# Patient Record
Sex: FEMALE | Race: BLACK OR AFRICAN AMERICAN | NOT HISPANIC OR LATINO
[De-identification: names, ages, dates, MRNs, and addresses within clinical notes are randomized per-mention and may not be internally consistent; named-entity substitution may affect disease eponyms.]

---

## 2017-03-10 PROBLEM — Z00.00 ENCOUNTER FOR PREVENTIVE HEALTH EXAMINATION: Status: ACTIVE | Noted: 2017-03-10

## 2018-03-26 ENCOUNTER — APPOINTMENT (OUTPATIENT)
Dept: PULMONOLOGY | Facility: CLINIC | Age: 83
End: 2018-03-26
Payer: MEDICARE

## 2018-03-26 VITALS
HEART RATE: 69 BPM | WEIGHT: 154 LBS | RESPIRATION RATE: 16 BRPM | DIASTOLIC BLOOD PRESSURE: 68 MMHG | TEMPERATURE: 99.2 F | OXYGEN SATURATION: 99 % | SYSTOLIC BLOOD PRESSURE: 140 MMHG | BODY MASS INDEX: 26.29 KG/M2 | HEIGHT: 64 IN

## 2018-03-26 DIAGNOSIS — Z86.79 PERSONAL HISTORY OF OTHER DISEASES OF THE CIRCULATORY SYSTEM: ICD-10-CM

## 2018-03-26 DIAGNOSIS — R06.02 SHORTNESS OF BREATH: ICD-10-CM

## 2018-03-26 PROCEDURE — 94060 EVALUATION OF WHEEZING: CPT

## 2018-03-26 PROCEDURE — 99204 OFFICE O/P NEW MOD 45 MIN: CPT | Mod: 25

## 2018-03-26 PROCEDURE — 94729 DIFFUSING CAPACITY: CPT

## 2018-03-26 PROCEDURE — 94727 GAS DIL/WSHOT DETER LNG VOL: CPT

## 2018-03-26 RX ORDER — LISINOPRIL 20 MG/1
20 TABLET ORAL
Refills: 0 | Status: ACTIVE | COMMUNITY

## 2018-03-26 RX ORDER — AMLODIPINE BESYLATE 10 MG/1
10 TABLET ORAL
Refills: 0 | Status: ACTIVE | COMMUNITY

## 2018-10-29 ENCOUNTER — EMERGENCY (EMERGENCY)
Facility: HOSPITAL | Age: 83
LOS: 1 days | Discharge: HOME | End: 2018-10-29
Attending: EMERGENCY MEDICINE | Admitting: EMERGENCY MEDICINE
Payer: MEDICARE

## 2018-10-29 VITALS
SYSTOLIC BLOOD PRESSURE: 156 MMHG | RESPIRATION RATE: 18 BRPM | HEART RATE: 64 BPM | OXYGEN SATURATION: 100 % | DIASTOLIC BLOOD PRESSURE: 60 MMHG | TEMPERATURE: 99 F

## 2018-10-29 VITALS
HEART RATE: 66 BPM | SYSTOLIC BLOOD PRESSURE: 161 MMHG | DIASTOLIC BLOOD PRESSURE: 90 MMHG | OXYGEN SATURATION: 100 % | RESPIRATION RATE: 18 BRPM

## 2018-10-29 DIAGNOSIS — Z90.49 ACQUIRED ABSENCE OF OTHER SPECIFIED PARTS OF DIGESTIVE TRACT: Chronic | ICD-10-CM

## 2018-10-29 DIAGNOSIS — D25.2 SUBSEROSAL LEIOMYOMA OF UTERUS: Chronic | ICD-10-CM

## 2018-10-29 DIAGNOSIS — Z90.89 ACQUIRED ABSENCE OF OTHER ORGANS: Chronic | ICD-10-CM

## 2018-10-29 DIAGNOSIS — Z90.710 ACQUIRED ABSENCE OF BOTH CERVIX AND UTERUS: Chronic | ICD-10-CM

## 2018-10-29 LAB
ALBUMIN SERPL ELPH-MCNC: 4.7 G/DL — SIGNIFICANT CHANGE UP (ref 3.3–5)
ALP SERPL-CCNC: 93 U/L — SIGNIFICANT CHANGE UP (ref 40–120)
ALT FLD-CCNC: 11 U/L — SIGNIFICANT CHANGE UP (ref 4–33)
AST SERPL-CCNC: 16 U/L — SIGNIFICANT CHANGE UP (ref 4–32)
BASOPHILS # BLD AUTO: 0.05 K/UL — SIGNIFICANT CHANGE UP (ref 0–0.2)
BASOPHILS NFR BLD AUTO: 0.6 % — SIGNIFICANT CHANGE UP (ref 0–2)
BILIRUB SERPL-MCNC: 0.5 MG/DL — SIGNIFICANT CHANGE UP (ref 0.2–1.2)
BUN SERPL-MCNC: 13 MG/DL — SIGNIFICANT CHANGE UP (ref 7–23)
CALCIUM SERPL-MCNC: 9.9 MG/DL — SIGNIFICANT CHANGE UP (ref 8.4–10.5)
CHLORIDE SERPL-SCNC: 97 MMOL/L — LOW (ref 98–107)
CO2 SERPL-SCNC: 27 MMOL/L — SIGNIFICANT CHANGE UP (ref 22–31)
CREAT SERPL-MCNC: 0.91 MG/DL — SIGNIFICANT CHANGE UP (ref 0.5–1.3)
EOSINOPHIL # BLD AUTO: 0.04 K/UL — SIGNIFICANT CHANGE UP (ref 0–0.5)
EOSINOPHIL NFR BLD AUTO: 0.5 % — SIGNIFICANT CHANGE UP (ref 0–6)
GLUCOSE SERPL-MCNC: 95 MG/DL — SIGNIFICANT CHANGE UP (ref 70–99)
HCT VFR BLD CALC: 35 % — SIGNIFICANT CHANGE UP (ref 34.5–45)
HGB BLD-MCNC: 11.2 G/DL — LOW (ref 11.5–15.5)
IMM GRANULOCYTES # BLD AUTO: 0.02 # — SIGNIFICANT CHANGE UP
IMM GRANULOCYTES NFR BLD AUTO: 0.3 % — SIGNIFICANT CHANGE UP (ref 0–1.5)
LYMPHOCYTES # BLD AUTO: 2.2 K/UL — SIGNIFICANT CHANGE UP (ref 1–3.3)
LYMPHOCYTES # BLD AUTO: 27.6 % — SIGNIFICANT CHANGE UP (ref 13–44)
MCHC RBC-ENTMCNC: 25.1 PG — LOW (ref 27–34)
MCHC RBC-ENTMCNC: 32 % — SIGNIFICANT CHANGE UP (ref 32–36)
MCV RBC AUTO: 78.3 FL — LOW (ref 80–100)
MONOCYTES # BLD AUTO: 0.54 K/UL — SIGNIFICANT CHANGE UP (ref 0–0.9)
MONOCYTES NFR BLD AUTO: 6.8 % — SIGNIFICANT CHANGE UP (ref 2–14)
NEUTROPHILS # BLD AUTO: 5.11 K/UL — SIGNIFICANT CHANGE UP (ref 1.8–7.4)
NEUTROPHILS NFR BLD AUTO: 64.2 % — SIGNIFICANT CHANGE UP (ref 43–77)
NRBC # FLD: 0 — SIGNIFICANT CHANGE UP
PLATELET # BLD AUTO: 380 K/UL — SIGNIFICANT CHANGE UP (ref 150–400)
PMV BLD: 12.3 FL — SIGNIFICANT CHANGE UP (ref 7–13)
POTASSIUM SERPL-MCNC: 3.7 MMOL/L — SIGNIFICANT CHANGE UP (ref 3.5–5.3)
POTASSIUM SERPL-SCNC: 3.7 MMOL/L — SIGNIFICANT CHANGE UP (ref 3.5–5.3)
PROT SERPL-MCNC: 8.1 G/DL — SIGNIFICANT CHANGE UP (ref 6–8.3)
RBC # BLD: 4.47 M/UL — SIGNIFICANT CHANGE UP (ref 3.8–5.2)
RBC # FLD: 13.4 % — SIGNIFICANT CHANGE UP (ref 10.3–14.5)
SODIUM SERPL-SCNC: 138 MMOL/L — SIGNIFICANT CHANGE UP (ref 135–145)
TROPONIN T, HIGH SENSITIVITY: 7 NG/L — SIGNIFICANT CHANGE UP (ref ?–14)
TSH SERPL-MCNC: 3.2 UIU/ML — SIGNIFICANT CHANGE UP (ref 0.27–4.2)
WBC # BLD: 7.96 K/UL — SIGNIFICANT CHANGE UP (ref 3.8–10.5)
WBC # FLD AUTO: 7.96 K/UL — SIGNIFICANT CHANGE UP (ref 3.8–10.5)

## 2018-10-29 PROCEDURE — 71046 X-RAY EXAM CHEST 2 VIEWS: CPT | Mod: 26

## 2018-10-29 PROCEDURE — 99284 EMERGENCY DEPT VISIT MOD MDM: CPT | Mod: GC

## 2018-10-29 RX ORDER — SODIUM CHLORIDE 9 MG/ML
1000 INJECTION INTRAMUSCULAR; INTRAVENOUS; SUBCUTANEOUS ONCE
Qty: 0 | Refills: 0 | Status: COMPLETED | OUTPATIENT
Start: 2018-10-29 | End: 2018-10-29

## 2018-10-29 RX ADMIN — SODIUM CHLORIDE 2000 MILLILITER(S): 9 INJECTION INTRAMUSCULAR; INTRAVENOUS; SUBCUTANEOUS at 18:39

## 2018-10-29 NOTE — ED ADULT NURSE NOTE - OBJECTIVE STATEMENT
Pt received to spot #28. AAOx4, c/o feeling lightheadedness with occasional dizziness x1 month. Denies falls/LOC. Denies cp/sob. No neuro deficits noted. Able to move all extremities well. MD alexandre performed at bedside. #20g IVSL placed to left ac, labs drawn and sent. no acute distress. Awaiting further plan of care.

## 2018-10-29 NOTE — ED PROVIDER NOTE - PLAN OF CARE
You were seen in the ED for lightheadedness. You had lab work and imaging without clear cause. Please continue to follow with your PMD and neurology upon discharge.

## 2018-10-29 NOTE — ED PROVIDER NOTE - NS ED ROS FT
GENERAL: No fever or chills, EYES: no change in vision, HEENT: no trouble swallowing or speaking, CARDIAC: no chest pain, PULMONARY: no cough or SOB, GI: no abdominal pain, no nausea, no vomiting, no diarrhea or constipation, : No changes in urination, SKIN: no rashes, NEURO: no headache, +lightheadedness, MSK: No joint pain ~Shilo Nunez M.D. Resident

## 2018-10-29 NOTE — ED ADULT NURSE NOTE - NSIMPLEMENTINTERV_GEN_ALL_ED
Implemented All Universal Safety Interventions:  Ryan to call system. Call bell, personal items and telephone within reach. Instruct patient to call for assistance. Room bathroom lighting operational. Non-slip footwear when patient is off stretcher. Physically safe environment: no spills, clutter or unnecessary equipment. Stretcher in lowest position, wheels locked, appropriate side rails in place.

## 2018-10-29 NOTE — ED ADULT NURSE REASSESSMENT NOTE - NS ED NURSE REASSESS COMMENT FT1
PT in no acute distress, states improvement, would like to be dc'd.  PEr ED MD Hahn results WNL and stable for dc.  ED MD Cedeno @ bedside pt is dc'd copy results and follow up instructions reviewed w/ pt and provided.  IV removed by ED.  Stable at time of exit.

## 2018-10-29 NOTE — ED PROVIDER NOTE - CARE PLAN
Principal Discharge DX:	Giddiness  Assessment and plan of treatment:	You were seen in the ED for lightheadedness. You had lab work and imaging without clear cause. Please continue to follow with your PMD and neurology upon discharge. Principal Discharge DX:	Lightheadedness  Assessment and plan of treatment:	You were seen in the ED for lightheadedness. You had lab work and imaging without clear cause. Please continue to follow with your PMD and neurology upon discharge.

## 2018-10-29 NOTE — ED PROVIDER NOTE - OBJECTIVE STATEMENT
85 yo F PMHx HTN p/w lightheadedness x 1 month. Pt saw her PMD last week (Dr. Speedy Vences) who ana labs and referred her to neurology. She has an appt in 2 days but today she felt worsening fogginess and felt like she was going to pass out so she came to the ER. She has not syncopized for the past month and her lightheadedness has been constant - unchaged by position changed. Denies CP/palpitations, headaches, vision changes, N/V, abd pain, fevers/chills.

## 2018-10-29 NOTE — ED PROVIDER NOTE - PSH
H/O abdominal hysterectomy    History of appendectomy    History of tonsillectomy    Subserous leiomyoma of uterus

## 2018-10-29 NOTE — ED ADULT TRIAGE NOTE - CHIEF COMPLAINT QUOTE
Light headiness x 1 month, saw PMD and has neurologist appt scheduled for next week.  Boil on right side of gum.

## 2018-10-29 NOTE — ED PROVIDER NOTE - PHYSICAL EXAMINATION
Gen: AAOx3, non-toxic  Head: NCAT  HEENT: EOMI, oral mucosa moist, normal conjunctiva  Lung: CTAB, no respiratory distress, no wheezes/rhonchi/rales B/L, speaking in full sentences  CV: RRR, no murmurs, rubs or gallops  Abd: soft, NTND, no guarding  MSK: no visible deformities  Neuro: No focal sensory or motor deficits, PERRL, EOMi, CN II-XII intact, +5/5 upper and lower ext strength  Skin: Warm, well perfused, no rash  Psych: normal affect.   ~Shilo Nunez M.D. Resident

## 2018-10-29 NOTE — ED PROVIDER NOTE - ATTENDING CONTRIBUTION TO CARE
84F p/w lightheaded x 1 month, feels like she’s going to pass out.  Constant throughout the day, saw PCP, referred to neuro, has appt in 2 days.  Today sx are worse, she feels foggy.  VS wnl.  Exam benign, able to stand, sx do not worsen upon standing.  No room spinning.  No recent cardiac eval.  No CP/palps, normal neuro exam.  Plan check labs, EKG, give fluids.  EKG unchanged - cath 2 years ago was unremarkable.  Exam bengin.  Unclear etiology of sx but does not appear ill.  Check labs, give fluids, reassess, likely d/c home f/u PMD.  on 3 BP meds, but BP sl elevation here.   VS:  unremarkable    GEN - NAD; well appearing; A+O x3   HEAD - NC/AT     ENT - PEERL, EOMI, mucous membranes  moist , no discharge      NECK: Neck supple, non-tender without lymphadenopathy, no masses, no JVD  PULM - CTA b/l,  symmetric breath sounds  COR -  normal heart sounds    ABD - , ND, NT, soft,  BACK - no CVA tenderness, nontender spine     EXTREMS - no edema, no deformity, warm and well perfused    SKIN - no rash or bruising      NEUROLOGIC - alert, CN 2-12 intact, sensation nl, motor no focal deficit.

## 2018-10-29 NOTE — ED PROVIDER NOTE - MEDICAL DECISION MAKING DETAILS
85 yo F PMHx HTN p/w lightheadedness x 1 month, will obtain basic labs + trop, TSH, EKG, CXR, IVF, reevaluate 83 yo F PMHx HTN p/w lightheadedness x 1 month, will obtain basic labs + trop, TSH, EKG, CXR, IVF, reevaluate. EKG wnl, CXR clear. Labs wnl. D.c with neuro follow up

## 2023-02-06 ENCOUNTER — OFFICE (OUTPATIENT)
Dept: URBAN - METROPOLITAN AREA CLINIC 90 | Facility: CLINIC | Age: 88
Setting detail: OPHTHALMOLOGY
End: 2023-02-06
Payer: MEDICARE

## 2023-02-06 DIAGNOSIS — H40.1112: ICD-10-CM

## 2023-02-06 DIAGNOSIS — H40.1123: ICD-10-CM

## 2023-02-06 PROCEDURE — 92083 EXTENDED VISUAL FIELD XM: CPT | Performed by: OPHTHALMOLOGY

## 2023-02-07 ASSESSMENT — REFRACTION_CURRENTRX
OS_OVR_VA: 20/
OD_SPHERE: +2.25
OD_CYLINDER: -1.00
OS_ADD: +2.75
OD_AXIS: 098
OS_CYLINDER: -1.00
OS_CYLINDER: -0.75
OS_ADD: +2.75
OD_CYLINDER: -1.25
OS_OVR_VA: 20/
OD_AXIS: 095
OS_VPRISM_DIRECTION: PROGS
OS_AXIS: 086
OS_AXIS: 100
OD_VPRISM_DIRECTION: PROGS
OS_SPHERE: +1.50
OD_VPRISM_DIRECTION: PROGS
OS_VPRISM_DIRECTION: PROGS
OD_AXIS: 005
OS_OVR_VA: 20/
OS_SPHERE: +2.00
OD_ADD: +2.75
OS_VPRISM_DIRECTION: PROGS
OD_VPRISM_DIRECTION: PROGS
OS_CYLINDER: -0.75
OS_ADD: +2.75
OD_CYLINDER: +1.00
OS_CYLINDER: +1.00
OD_ADD: +2.75
OS_SPHERE: +1.00
OD_CYLINDER: -1.25
OD_OVR_VA: 20/
OD_SPHERE: +2.00
OD_VPRISM_DIRECTION: PROGS
OD_SPHERE: +1.25
OD_SPHERE: +2.00
OS_AXIS: 012
OD_AXIS: 090
OS_AXIS: 103
OS_ADD: +2.50
OD_ADD: +2.50
OS_SPHERE: +1.75
OD_ADD: +2.75
OS_VPRISM_DIRECTION: PROGS
OD_OVR_VA: 20/
OD_OVR_VA: 20/

## 2023-02-07 ASSESSMENT — REFRACTION_MANIFEST
OS_SPHERE: +1.00
OS_AXIS: 105
OD_SPHERE: +2.75
OS_CYLINDER: -1.25
OD_CYLINDER: -1.75
OS_SPHERE: UNABLE
OD_AXIS: 085
OD_VA2: 20/30(J2)
OS_SPHERE: +2.25
OD_CYLINDER: -2.00
OS_ADD: +3.00
OD_AXIS: 100
OS_CYLINDER: +0.75
OD_SPHERE: +0.75
OD_VA1: 20/40
OD_CYLINDER: +1.75
OD_ADD: +3.00
OS_VA2: 20/30(J2)
OS_AXIS: 020
OD_VA1: 20/40
OD_VA1: 20/40-2
OS_VA1: 20/40
OS_VA1: 20/400-
OD_AXIS: 002

## 2023-02-07 ASSESSMENT — KERATOMETRY
METHOD_AUTO_MANUAL: AUTO
OS_AXISANGLE_DEGREES: 105
OS_K1POWER_DIOPTERS: 42.25
OD_K1POWER_DIOPTERS: 41.75
OD_AXISANGLE_DEGREES: 016
OS_K2POWER_DIOPTERS: 42.75
OD_K2POWER_DIOPTERS: 42.25

## 2023-02-07 ASSESSMENT — SPHEQUIV_DERIVED
OS_SPHEQUIV: 1.875
OD_SPHEQUIV: 1.625
OS_SPHEQUIV: 1.625
OS_SPHEQUIV: 1.375
OD_SPHEQUIV: 2.5
OD_SPHEQUIV: 1.875

## 2023-02-07 ASSESSMENT — AXIALLENGTH_DERIVED
OS_AL: 23.4247
OD_AL: 23.1763
OS_AL: 23.3291
OS_AL: 23.2342
OD_AL: 23.51
OD_AL: 23.4137

## 2023-02-07 ASSESSMENT — REFRACTION_AUTOREFRACTION
OS_CYLINDER: -0.75
OD_CYLINDER: -1.50
OS_AXIS: 112
OD_AXIS: 097
OS_SPHERE: +2.25
OD_SPHERE: +3.25

## 2023-02-07 ASSESSMENT — VISUAL ACUITY
OS_BCVA: 20/30+2
OD_BCVA: LP

## 2023-02-08 ENCOUNTER — OFFICE (OUTPATIENT)
Dept: URBAN - METROPOLITAN AREA CLINIC 90 | Facility: CLINIC | Age: 88
Setting detail: OPHTHALMOLOGY
End: 2023-02-08
Payer: MEDICARE

## 2023-02-08 DIAGNOSIS — H02.834: ICD-10-CM

## 2023-02-08 DIAGNOSIS — H25.13: ICD-10-CM

## 2023-02-08 DIAGNOSIS — H02.831: ICD-10-CM

## 2023-02-08 DIAGNOSIS — H40.1112: ICD-10-CM

## 2023-02-08 DIAGNOSIS — H40.1123: ICD-10-CM

## 2023-02-08 DIAGNOSIS — H02.134: ICD-10-CM

## 2023-02-08 DIAGNOSIS — H02.403: ICD-10-CM

## 2023-02-08 DIAGNOSIS — H16.223: ICD-10-CM

## 2023-02-08 PROCEDURE — 92012 INTRM OPH EXAM EST PATIENT: CPT | Performed by: OPHTHALMOLOGY

## 2023-02-08 ASSESSMENT — SPHEQUIV_DERIVED
OD_SPHEQUIV: 1.875
OD_SPHEQUIV: 2.5
OD_SPHEQUIV: 1.625
OS_SPHEQUIV: 1.875
OS_SPHEQUIV: 1.625
OS_SPHEQUIV: 1.375

## 2023-02-08 ASSESSMENT — SUPERFICIAL PUNCTATE KERATITIS (SPK)
OD_SPK: T
OS_SPK: T

## 2023-02-08 ASSESSMENT — REFRACTION_AUTOREFRACTION
OD_AXIS: 097
OS_CYLINDER: -0.75
OS_AXIS: 112
OD_CYLINDER: -1.50
OS_SPHERE: +2.25
OD_SPHERE: +3.25

## 2023-02-08 ASSESSMENT — REFRACTION_CURRENTRX
OD_OVR_VA: 20/
OS_ADD: +2.50
OS_OVR_VA: 20/
OD_AXIS: 090
OD_VPRISM_DIRECTION: PROGS
OD_VPRISM_DIRECTION: PROGS
OS_ADD: +2.75
OD_ADD: +2.75
OD_SPHERE: +2.00
OS_VPRISM_DIRECTION: PROGS
OS_CYLINDER: +1.00
OS_SPHERE: +1.75
OS_VPRISM_DIRECTION: PROGS
OD_CYLINDER: -1.00
OD_OVR_VA: 20/
OD_CYLINDER: -1.25
OD_AXIS: 098
OD_SPHERE: +2.25
OS_SPHERE: +1.50
OD_AXIS: 095
OS_AXIS: 012
OS_OVR_VA: 20/
OD_SPHERE: +2.00
OD_ADD: +2.75
OD_OVR_VA: 20/
OS_VPRISM_DIRECTION: PROGS
OS_AXIS: 100
OS_VPRISM_DIRECTION: PROGS
OS_CYLINDER: -1.00
OS_AXIS: 103
OD_CYLINDER: -1.25
OD_CYLINDER: +1.00
OS_ADD: +2.75
OS_CYLINDER: -0.75
OS_AXIS: 086
OS_CYLINDER: -0.75
OS_ADD: +2.75
OD_VPRISM_DIRECTION: PROGS
OD_AXIS: 005
OS_OVR_VA: 20/
OD_SPHERE: +1.25
OD_ADD: +2.50
OS_SPHERE: +2.00
OD_ADD: +2.75
OS_SPHERE: +1.00
OD_VPRISM_DIRECTION: PROGS

## 2023-02-08 ASSESSMENT — LID POSITION - DERMATOCHALASIS
OS_DERMATOCHALASIS: LUL 3+
OD_DERMATOCHALASIS: RUL 3+

## 2023-02-08 ASSESSMENT — AXIALLENGTH_DERIVED
OS_AL: 23.2342
OD_AL: 23.51
OS_AL: 23.3291
OS_AL: 23.4247
OD_AL: 23.1763
OD_AL: 23.4137

## 2023-02-08 ASSESSMENT — REFRACTION_MANIFEST
OS_VA1: 20/40
OS_VA1: 20/400-
OS_AXIS: 105
OD_ADD: +3.00
OD_VA1: 20/40-2
OS_VA2: 20/30(J2)
OD_CYLINDER: -1.75
OD_VA1: 20/40
OD_AXIS: 002
OS_CYLINDER: +0.75
OS_SPHERE: UNABLE
OD_VA2: 20/30(J2)
OD_SPHERE: +2.75
OS_SPHERE: +1.00
OD_VA1: 20/40
OS_SPHERE: +2.25
OD_SPHERE: +0.75
OD_AXIS: 100
OD_AXIS: 085
OS_AXIS: 020
OD_CYLINDER: +1.75
OD_CYLINDER: -2.00
OS_CYLINDER: -1.25
OS_ADD: +3.00

## 2023-02-08 ASSESSMENT — KERATOMETRY
OD_AXISANGLE_DEGREES: 016
OS_K1POWER_DIOPTERS: 42.25
OD_K2POWER_DIOPTERS: 42.25
OS_AXISANGLE_DEGREES: 105
METHOD_AUTO_MANUAL: AUTO
OS_K2POWER_DIOPTERS: 42.75
OD_K1POWER_DIOPTERS: 41.75

## 2023-02-08 ASSESSMENT — PACHYMETRY
OS_CT_CORRECTION: 2
OD_CT_CORRECTION: 2
OS_CT_UM: 515
OD_CT_UM: 516

## 2023-02-08 ASSESSMENT — LID EXAM ASSESSMENTS: OS_COMMENTS: NARROW FISSURE

## 2023-02-08 ASSESSMENT — VISUAL ACUITY
OS_BCVA: 20/40+1
OD_BCVA: LP

## 2023-02-08 ASSESSMENT — CONFRONTATIONAL VISUAL FIELD TEST (CVF)
OS_FINDINGS: FULL
OD_FINDINGS: FULL

## 2023-02-08 ASSESSMENT — CORNEAL DYSTROPHY - BAND KERATOPATHY
OS_BANDKERATOPATHY: 1+
OD_BANDKERATOPATHY: 1+

## 2023-02-08 ASSESSMENT — LID POSITION - PTOSIS
OD_PTOSIS: RUL 1+
OS_PTOSIS: LUL 2+

## 2023-02-08 ASSESSMENT — LID POSITION - ECTROPION: OS_ECTROPION: LUL 1+

## 2023-02-08 ASSESSMENT — TONOMETRY: OD_IOP_MMHG: 17

## 2023-05-24 ENCOUNTER — OFFICE (OUTPATIENT)
Dept: URBAN - METROPOLITAN AREA CLINIC 90 | Facility: CLINIC | Age: 88
Setting detail: OPHTHALMOLOGY
End: 2023-05-24
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
## 2023-05-24 PROCEDURE — NO SHOW FE NO SHOW FEE: Performed by: OPHTHALMOLOGY

## 2023-10-26 ENCOUNTER — OFFICE (OUTPATIENT)
Dept: URBAN - METROPOLITAN AREA CLINIC 90 | Facility: CLINIC | Age: 88
Setting detail: OPHTHALMOLOGY
End: 2023-10-26
Payer: MEDICARE

## 2023-10-26 DIAGNOSIS — H02.834: ICD-10-CM

## 2023-10-26 DIAGNOSIS — H02.831: ICD-10-CM

## 2023-10-26 DIAGNOSIS — H40.1112: ICD-10-CM

## 2023-10-26 DIAGNOSIS — H25.13: ICD-10-CM

## 2023-10-26 PROBLEM — H02.135 ECTROPION-SENILE; LEFT LOWER LID: Status: ACTIVE | Noted: 2023-10-26

## 2023-10-26 PROCEDURE — 92250 FUNDUS PHOTOGRAPHY W/I&R: CPT | Performed by: OPHTHALMOLOGY

## 2023-10-26 PROCEDURE — 92014 COMPRE OPH EXAM EST PT 1/>: CPT | Performed by: OPHTHALMOLOGY

## 2023-10-26 ASSESSMENT — AXIALLENGTH_DERIVED
OD_AL: 23.4137
OS_AL: 23.5156
OD_AL: 23.2234
OS_AL: 23.3713
OD_AL: 23.51
OS_AL: 23.4192

## 2023-10-26 ASSESSMENT — REFRACTION_MANIFEST
OD_CYLINDER: -2.00
OS_ADD: +3.00
OD_CYLINDER: -1.75
OD_VA1: 20/40-2
OD_ADD: +3.00
OD_SPHERE: +2.75
OS_VA2: 20/30(J2)
OS_VA1: 20/40
OS_CYLINDER: +0.75
OS_CYLINDER: -1.25
OS_SPHERE: +2.25
OS_AXIS: 105
OD_VA2: 20/30(J2)
OD_SPHERE: +0.75
OS_SPHERE: +1.00
OS_SPHERE: UNABLE
OD_AXIS: 002
OS_VA1: 20/400-
OD_VA1: 20/40
OD_CYLINDER: +1.75
OD_VA1: 20/40
OD_AXIS: 100
OD_AXIS: 085
OS_AXIS: 020

## 2023-10-26 ASSESSMENT — REFRACTION_CURRENTRX
OS_SPHERE: +2.00
OD_CYLINDER: -1.00
OD_OVR_VA: 20/
OS_CYLINDER: -0.75
OD_OVR_VA: 20/
OD_AXIS: 090
OD_SPHERE: +1.25
OS_AXIS: 012
OS_VPRISM_DIRECTION: PROGS
OD_CYLINDER: +1.00
OS_OVR_VA: 20/
OD_VPRISM_DIRECTION: PROGS
OS_OVR_VA: 20/
OS_VPRISM_DIRECTION: PROGS
OS_AXIS: 103
OS_ADD: +2.75
OD_SPHERE: +2.25
OD_AXIS: 095
OD_VPRISM_DIRECTION: PROGS
OD_ADD: +2.75
OS_SPHERE: +1.75
OS_ADD: +2.75
OD_SPHERE: +2.00
OS_AXIS: 086
OD_AXIS: 005
OD_CYLINDER: -1.25
OS_VPRISM_DIRECTION: PROGS
OS_CYLINDER: -1.00
OS_CYLINDER: -0.75
OD_ADD: +2.75
OS_ADD: +2.75
OD_CYLINDER: -1.25
OS_ADD: +2.50
OD_OVR_VA: 20/
OD_AXIS: 098
OS_OVR_VA: 20/
OD_SPHERE: +2.00
OS_CYLINDER: +1.00
OD_VPRISM_DIRECTION: PROGS
OS_SPHERE: +1.00
OS_SPHERE: +1.50
OD_VPRISM_DIRECTION: PROGS
OS_VPRISM_DIRECTION: PROGS
OD_ADD: +2.75
OD_ADD: +2.50
OS_AXIS: 100

## 2023-10-26 ASSESSMENT — LID POSITION - ECTROPION: OS_ECTROPION: LLL 1+

## 2023-10-26 ASSESSMENT — REFRACTION_AUTOREFRACTION
OS_CYLINDER: -1.50
OD_AXIS: 091
OD_CYLINDER: -1.75
OD_SPHERE: +3.25
OS_SPHERE: +2.50
OS_AXIS: 101

## 2023-10-26 ASSESSMENT — LID POSITION - DERMATOCHALASIS
OD_DERMATOCHALASIS: RUL 3+
OS_DERMATOCHALASIS: LUL 3+

## 2023-10-26 ASSESSMENT — LID POSITION - PTOSIS
OS_PTOSIS: LUL 2+
OD_PTOSIS: RUL 1+

## 2023-10-26 ASSESSMENT — CORNEAL DYSTROPHY - BAND KERATOPATHY
OD_BANDKERATOPATHY: 1+
OS_BANDKERATOPATHY: 1+

## 2023-10-26 ASSESSMENT — KERATOMETRY
OD_K1POWER_DIOPTERS: 41.50
OS_K1POWER_DIOPTERS: 42.00
METHOD_AUTO_MANUAL: AUTO
OS_AXISANGLE_DEGREES: 148
OD_AXISANGLE_DEGREES: 013
OS_K2POWER_DIOPTERS: 42.50
OD_K2POWER_DIOPTERS: 42.50

## 2023-10-26 ASSESSMENT — SPHEQUIV_DERIVED
OD_SPHEQUIV: 2.375
OD_SPHEQUIV: 1.625
OS_SPHEQUIV: 1.625
OD_SPHEQUIV: 1.875
OS_SPHEQUIV: 1.375
OS_SPHEQUIV: 1.75

## 2023-10-26 ASSESSMENT — VISUAL ACUITY
OD_BCVA: LP
OS_BCVA: 20/30-2

## 2023-10-26 ASSESSMENT — LID EXAM ASSESSMENTS: OS_COMMENTS: NARROW FISSURE

## 2023-10-26 ASSESSMENT — PACHYMETRY
OS_CT_UM: 515
OD_CT_UM: 516
OD_CT_CORRECTION: 2
OS_CT_CORRECTION: 2

## 2023-10-26 ASSESSMENT — CONFRONTATIONAL VISUAL FIELD TEST (CVF)
OD_FINDINGS: FULL
OS_FINDINGS: FULL

## 2023-10-26 ASSESSMENT — SUPERFICIAL PUNCTATE KERATITIS (SPK)
OD_SPK: T
OS_SPK: T

## 2023-11-14 ENCOUNTER — OFFICE (OUTPATIENT)
Facility: LOCATION | Age: 88
Setting detail: OPHTHALMOLOGY
End: 2023-11-14
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
## 2023-11-14 PROCEDURE — NO SHOW FE NO SHOW FEE: Performed by: OPHTHALMOLOGY

## 2024-01-25 ENCOUNTER — OFFICE (OUTPATIENT)
Dept: URBAN - METROPOLITAN AREA CLINIC 90 | Facility: CLINIC | Age: 89
Setting detail: OPHTHALMOLOGY
End: 2024-01-25
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
## 2024-01-25 PROCEDURE — NO SHOW FE NO SHOW FEE: Performed by: OPHTHALMOLOGY
